# Patient Record
Sex: FEMALE | Race: WHITE | Employment: UNEMPLOYED | URBAN - METROPOLITAN AREA
[De-identification: names, ages, dates, MRNs, and addresses within clinical notes are randomized per-mention and may not be internally consistent; named-entity substitution may affect disease eponyms.]

---
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
## 2023-02-04 ENCOUNTER — APPOINTMENT (OUTPATIENT)
Dept: ULTRASOUND IMAGING | Age: 18
End: 2023-02-04
Payer: MEDICAID

## 2023-02-04 ENCOUNTER — HOSPITAL ENCOUNTER (EMERGENCY)
Age: 18
Discharge: HOME | End: 2023-02-04
Payer: MEDICAID

## 2023-02-04 VITALS
BODY MASS INDEX: 37.67 KG/M2 | TEMPERATURE: 97.6 F | DIASTOLIC BLOOD PRESSURE: 74 MMHG | HEART RATE: 86 BPM | SYSTOLIC BLOOD PRESSURE: 112 MMHG | HEIGHT: 67 IN | WEIGHT: 240 LBS | OXYGEN SATURATION: 99 % | RESPIRATION RATE: 16 BRPM

## 2023-02-04 DIAGNOSIS — O21.9 NAUSEA/VOMITING IN PREGNANCY: Primary | ICD-10-CM

## 2023-02-04 DIAGNOSIS — J06.9 VIRAL URI: ICD-10-CM

## 2023-02-04 LAB
BILIRUBIN URINE: NEGATIVE
BLOOD, URINE: NEGATIVE
CLARITY: CLEAR
COLOR: YELLOW
EKG ATRIAL RATE: 86 BPM
EKG DIAGNOSIS: NORMAL
EKG P AXIS: 51 DEGREES
EKG P-R INTERVAL: 124 MS
EKG Q-T INTERVAL: 358 MS
EKG QRS DURATION: 86 MS
EKG QTC CALCULATION (BAZETT): 428 MS
EKG R AXIS: 56 DEGREES
EKG T AXIS: 29 DEGREES
EKG VENTRICULAR RATE: 86 BPM
GLUCOSE URINE: NEGATIVE MG/DL
INFLUENZA A: NOT DETECTED
INFLUENZA B: NOT DETECTED
KETONES, URINE: 15 MG/DL
LEUKOCYTE ESTERASE, URINE: NEGATIVE
MICROSCOPIC EXAMINATION: ABNORMAL
NITRITE, URINE: NEGATIVE
PH UA: 6 (ref 5–8)
PROTEIN UA: NEGATIVE MG/DL
SARS-COV-2 RNA, RT PCR: NOT DETECTED
SPECIFIC GRAVITY UA: 1.02 (ref 1–1.03)
URINE REFLEX TO CULTURE: ABNORMAL
URINE TYPE: ABNORMAL
UROBILINOGEN, URINE: 0.2 E.U./DL

## 2023-02-04 PROCEDURE — 93005 ELECTROCARDIOGRAM TRACING: CPT | Performed by: EMERGENCY MEDICINE

## 2023-02-04 PROCEDURE — 6370000000 HC RX 637 (ALT 250 FOR IP): Performed by: NURSE PRACTITIONER

## 2023-02-04 PROCEDURE — 76801 OB US < 14 WKS SINGLE FETUS: CPT

## 2023-02-04 PROCEDURE — 81003 URINALYSIS AUTO W/O SCOPE: CPT

## 2023-02-04 PROCEDURE — 99284 EMERGENCY DEPT VISIT MOD MDM: CPT

## 2023-02-04 PROCEDURE — 87636 SARSCOV2 & INF A&B AMP PRB: CPT

## 2023-02-04 PROCEDURE — 93010 ELECTROCARDIOGRAM REPORT: CPT | Performed by: INTERNAL MEDICINE

## 2023-02-04 RX ORDER — ONDANSETRON 4 MG/1
4 TABLET, FILM COATED ORAL 3 TIMES DAILY PRN
Qty: 15 TABLET | Refills: 0 | Status: SHIPPED | OUTPATIENT
Start: 2023-02-04

## 2023-02-04 RX ORDER — ONDANSETRON 4 MG/1
4 TABLET, ORALLY DISINTEGRATING ORAL ONCE
Status: COMPLETED | OUTPATIENT
Start: 2023-02-04 | End: 2023-02-04

## 2023-02-04 RX ADMIN — ONDANSETRON 4 MG: 4 TABLET, ORALLY DISINTEGRATING ORAL at 14:41

## 2023-02-04 ASSESSMENT — PAIN - FUNCTIONAL ASSESSMENT: PAIN_FUNCTIONAL_ASSESSMENT: 0-10

## 2023-02-04 ASSESSMENT — PAIN DESCRIPTION - LOCATION: LOCATION: PELVIS

## 2023-02-04 ASSESSMENT — PAIN SCALES - GENERAL: PAINLEVEL_OUTOF10: 2

## 2023-02-04 NOTE — ED PROVIDER NOTES
[facility]  ED  Emergency Department Encounter    Patient Name: Naif Walsh  MRN: [id number]  [place]: 2005  Date of Evaluation: 2/4/2023  Provider: Glorya Boeck, APRN - CNP  Note Started: 12:50 PM EST 2/4/23    CHIEF COMPLAINT  Pelvic Pain (Was dx with staph infection in urine yesterday and was told to come in to find out what abx to take due to her being 9 weeks pregnant, ), Hemoptysis (Dry cough started yesterday this morning noticed blood ), and Chest Pain (Started yesterday )    [facility]  Patient seen and evaluated in conjunction with Griffin Jean PA-C      HISTORY OF PRESENT ILLNESS  Naif Walsh is a 21 y.o. female who presents to the ED with hemoptysis and dysuria. Pt is G2, P1, sts she is 9 weeks pregnant. Patient reports she had an episode of vomiting this morning which is typical for her in this pregnancy and then began coughing afterwards, noticing blood-streaked sputum. She states this was 1 episode and self resolved. Has not had any further hemoptysis since. Patient also presents with dysuria and increased frequency starting approximately 3 weeks ago. She states she was recently seen at an outside urgent care and was started on antibiotics for possible UTI, however was called by the urgent care and told that she does not have an infection but may have a \"staph infection. \"  She states she was told to discontinue antibiotics and has not been taking any since. She continues to report increased frequency and dysuria with some chills. Denies any vaginal bleeding or discharge. Does have some lower abdominal discomfort. No fevers, flank pain, shortness of breath, diarrhea. No other complaints, modifying factors or associated symptoms. Nursing notes reviewed were all reviewed and agreed with or any disagreements were addressed in the HPI.     PMH:  Past Medical History:   Diagnosis Date    Anxiety     Depression     Liver disease Surgical History:  Past Surgical History:   Procedure Laterality Date    TYMPANOSTOMY TUBE PLACEMENT       Family History:  Family History   Problem Relation Age of Onset    Other Paternal Grandfather         back problems    Obesity Paternal Grandfather     Diabetes Paternal Grandfather     Rheum Arthritis Paternal Grandmother     Obesity Paternal Grandmother     Diabetes Paternal Grandmother     COPD Paternal Grandmother     Emphysema Paternal Grandmother     Other Maternal Grandmother         bone issues    No Known Problems Maternal Grandfather     Obesity Father     Heart Disease Father     Heart Attack Father     Diabetes Mother     No Known Problems Brother     No Known Problems Sister     No Known Problems Brother     No Known Problems Sister     No Known Problems Sister      Social History:  Social History     Socioeconomic History    Marital status: Single     Spouse name: Not on file    Number of children: Not on file    Years of education: Not on file    Highest education level: Not on file   Occupational History    Not on file   Tobacco Use    Smoking status: Former    Smokeless tobacco: Never   Vaping Use    Vaping Use: Former    Substances: Always   Substance and Sexual Activity    Alcohol use: Not Currently    Drug use: Not Currently     Types: Marijuana Alexus Pearl    Sexual activity: Yes     Partners: Male   Other Topics Concern    Not on file   Social History Narrative    Not on file     Social Determinants of Health     Financial Resource Strain: Not on file   Food Insecurity: Not on file   Transportation Needs: Not on file   Physical Activity: Not on file   Stress: Not on file   Social Connections: Not on file   Intimate Partner Violence: Not on file   Housing Stability: Not on file     Current Medications:  No current facility-administered medications for this encounter.      Current Outpatient Medications   Medication Sig Dispense Refill    ondansetron (ZOFRAN) 4 MG tablet Take 1 tablet by mouth 3 times daily as needed for Nausea or Vomiting 15 tablet 0    Prenatal Vit-DSS-Fe Fum-FA (PRENATAL 19) 29-1 MG TABS Take 1 tablet by mouth daily      ondansetron (ZOFRAN-ODT) 4 MG disintegrating tablet Take 4 mg by mouth every 8 hours as needed for Nausea or Vomiting       Allergies: Allergies   Allergen Reactions    Latex Rash    Amoxicillin Rash     Screenings:                 CIWA Assessment  BP: 112/74  Heart Rate: 86          REVIEW OF SYSTEMS  Positives and Pertinent Negatives as per HPI. PHYSICAL EXAM  /74   Pulse 86   Temp 97.6 °F (36.4 °C) (Oral)   Resp 16   Ht 5' 7\" (1.702 m)   Wt (!) 240 lb (108.9 kg)   SpO2 99%   BMI 37.59 kg/m²     GENERAL APPEARANCE: Awake and alert. Cooperative. No acute distress. HEAD: Normocephalic. Atraumatic. EYES:  EOM's grossly intact. ENT: Mucous membranes are pink and moist.   NECK: Supple. No JVD. HEART: RRR. No murmurs, rubs, or gallops. LUNGS: CTA B. No wheezing or rhonchi noted. Respirations unlabored. Good air exchange. ABDOMEN: Soft. Non-distended. Non-tender. No CVA tenderness  EXTREMITIES: No peripheral edema. Moves all extremities equally. All extremities neurovascularly intact. SKIN: Warm and dry. No acute rashes. NEUROLOGICAL: Alert and oriented. No gross facial drooping. Strength 5/5, sensation intact. EKG  When ordered, EKG's are interpreted by the ED Physician in the absence of a Cardiologist.  Please see their note for interpretation of EKG. LABS  Labs Reviewed   URINALYSIS WITH REFLEX TO CULTURE - Abnormal; Notable for the following components:       Result Value    Ketones, Urine 15 (*)     All other components within normal limits   COVID-19 & INFLUENZA COMBO     When ordered, only abnormal lab results are displayed. All other labs were within normal range or not returned as of this dictation.      RADIOLOGY  Non-plain film images such as CT, U/S, and MRI are read by the radiologist.  Plain radiographic images are visualized and preliminarily interpreted by the ED Provider with the below findings:       Interpretation per the Radiologist below, if available at the time of this note:  US OB LESS THAN 14 [address]   Final Result   Single intrauterine pregnancy. Gestational sac large compared to the   crown-rump length. Fetal heart motion seen. Gestational age calculated as   the average of the gestational sac mean diameter and crown-rump length is 9   weeks 0 days. Small subchorionic hemorrhage. Follow-up suggested           PROCEDURES  Unless otherwise noted below, none. ED COURSE/DDx/MDM  History obtained from:  Patient. Vitals:  Vitals:    02/04/23 1213 02/04/23 1445 02/04/23 1540   BP: 124/80 127/77 112/74   Pulse: 92 85 86   Resp: 18 16 16   Temp: 97.6 °F (36.4 °C)     TempSrc: Oral     SpO2: 100% 99% 99%   Weight: (!) 240 lb (108.9 kg)     Height: 5' 7\" (1.702 m)       Patient received following medications in ED:  Medications   ondansetron (ZOFRAN-ODT) disintegrating tablet 4 mg (4 mg Oral Given 2/4/23 1441)     Sepsis Consideration:  Is this patient to be included in the SEP-1 Core Measure due to severe sepsis or septic shock? No   Exclusion criteria - the patient is NOT to be included for SEP-1 Core Measure due to:  Viral etiology found or highly suspected (including COVID-19) without concomitant bacterial infection    Records Reviewed:   Previous ER visit from 1/11/23 reviewed where patient was able to be discharged home after lab work and vaginal exam.    Chronic conditions affecting care: Anxiety, depression, liver disease. has a past medical history of Anxiety, Depression, and Liver disease. Social Determinants:   None    Consults:   None    Reassessment:      Upon reassessment patient had no significant complaints or changes in condition. MDM/Disposition Considerations:   Briefly Scott Henderson presents for hemoptysis and dysuria, urinary frequency.   Patient reports ongoing dysuria and urinary frequency starting approximately 3 weeks ago and was seen recently at outside urgent care. 1 episode of hemoptysis after vomiting this a.m., self resolved. States she came in for evaluation due to concerns with urine infection and culture results from urgent care. She is currently 9 weeks pregnant without vaginal bleeding or discharge. COVID/influenza swab completed with negative result. Transvaginal ultrasound completed showing IUP with fetal heart tones present. Urinalysis without nitrates, leukocytes, or blood. Patient evaluated and reassessed without changes in condition or worsening complaints. Discussed all results and recommended follow-up with appropriate providers. Patient agreeable and stable for discharge home    Critical Care Time:   0 Minutes of critical care time spent not including separately billable procedures. DDx:  I estimate there is LOW risk for ACUTE CORONARY SYNDROME, INTRACRANIAL HEMORRHAGE, MALIGNANT DYSRHYTHMIA, MENINGITIS, PNEUMONIA, PULMONARY EMBOLISM, SEPSIS, SUBARACHNOID HEMORRHAGE, SUBDURAL HEMATOMA, STROKE, or URINARY TRACT INFECTION, thus I consider the discharge disposition reasonable. Paradise Minor and I have also discussed returning to the Emergency Department immediately if new or worsening symptoms occur. We have discussed the symptoms which are most concerning (e.g., changing or worsening pain, weakness, vomiting, fever) that necessitate immediate return. FINAL IMPRESSION  1. Nausea/vomiting in pregnancy    2. Viral URI      Patient referred to:  HEBER Crystal - CNP  [address]  [place] [address] [facility]  [phone]    Schedule an appointment as soon as possible for a visit in 2 days      [facility]  ED  [facility] [address] [phone]    If symptoms worsen    OB/GYN    Call   Please go to your scheduled OB visit.  Call to move up appt if worsening symptoms    Discharge Medications:   New Prescriptions    ONDANSETRON (ZOFRAN) 4 MG TABLET    Take 1 tablet by mouth 3 times daily as needed for Nausea or Vomiting     Discontinued Medications:  Discontinued Medications    No medications on file     Risk management discussed and shared decision making had with patient and/or surrogate. All questions were answered. Patient will follow up with primary care in 1 to 2 days as well as her scheduled OB visit for February 20, 2023 for further evaluation/treatment. All questions answered. Patient will return to ED for new/worsening symptoms. DISPOSITION:  Patient was discharged home in stable condition. (Please note that portions of this note were completed with a voice recognition program.  Efforts were made to edit the dictations but occasionally words are mis-transcribed).           Karen Francisco, HEBER - CNP  02/04/23 0137

## 2023-02-04 NOTE — ED PROVIDER NOTES
The Ekg interpreted by me shows  normal sinus rhythm with a rate of 86  Axis is   Normal  QTc is  normal  Intervals and Durations are unremarkable.       ST Segments: nonspecific changes, specific T wave abnormality in lead III  No previous available for comparison         Chris Avila MD  02/04/23 5992